# Patient Record
Sex: MALE | Race: BLACK OR AFRICAN AMERICAN | Employment: STUDENT | URBAN - METROPOLITAN AREA
[De-identification: names, ages, dates, MRNs, and addresses within clinical notes are randomized per-mention and may not be internally consistent; named-entity substitution may affect disease eponyms.]

---

## 2024-04-22 ENCOUNTER — OFFICE VISIT (OUTPATIENT)
Dept: ORTHOPEDIC SURGERY | Age: 24
End: 2024-04-22
Payer: COMMERCIAL

## 2024-04-22 DIAGNOSIS — S06.0X0A CONCUSSION WITHOUT LOSS OF CONSCIOUSNESS, INITIAL ENCOUNTER: Primary | ICD-10-CM

## 2024-04-22 PROCEDURE — 99203 OFFICE O/P NEW LOW 30 MIN: CPT | Performed by: STUDENT IN AN ORGANIZED HEALTH CARE EDUCATION/TRAINING PROGRAM

## 2024-04-22 NOTE — PROGRESS NOTES
Name: Jose Luis Patel  YOB: 2000  Gender: male  MRN: [id number]  Date of Encounter:  4/22/2024       CHIEF COMPLAINT:     Chief Complaint   Patient presents with    Concussion        Jose Lusi Patel is a 23 y.o. year-old male who presents for evaluation for concussion.    Injury Date: 4/16/24     Chandana is a [employer]  who took a hit from a [employer] fall ball bouncing into the dugout that hit the base of his left occiput.  He did not have a lot of pain immediately following the incident and continued to play the game, but at the end of the game he started having some blurry vision.  He was evaluated by his AT who felt he met symptoms consistent with concussion.  The following day he did have a mild headache with sensitivity to light and sound.  As the day went by his symptoms resolved and by Thursday he was asymptomatic.  He was then started on return to play protocol with initial exercise testing on the assault air bike and tolerated this without issue.  The next day he did some more strength testing exercises and had no return of symptoms.  He feels back to normal.  He has been attending class without issue even the first day after injury.    Loss of consciousness: No  Was the patient evaluated in medical professional/ER after injury? ATC  Imaging: None    Prior concussions: 2  If yes, what was past timelines for recovery: 1 week    Sleep   Reports normal sleep since the injury.   Sleep aids / medication: No    Pre-concussion status:  Pre-concussion/injury concerns for:   Anxiety - No  Migraines or frequent headaches -No  Inattention and/or hyperactivity: No  Motion-sickness: No  Mood or Depression: No     Academic History:  Concerns for learning problems? No  Past school performance: Good    ROS:   A full review of systems was reviewed and was positive and negative per the HPI and the post-concussion symptom scale above. It was otherwise negative regarding the